# Patient Record
Sex: FEMALE | Race: WHITE | NOT HISPANIC OR LATINO | Employment: FULL TIME | URBAN - METROPOLITAN AREA
[De-identification: names, ages, dates, MRNs, and addresses within clinical notes are randomized per-mention and may not be internally consistent; named-entity substitution may affect disease eponyms.]

---

## 2017-08-03 ENCOUNTER — OFFICE VISIT (OUTPATIENT)
Dept: URGENT CARE | Facility: CLINIC | Age: 57
End: 2017-08-03
Payer: COMMERCIAL

## 2017-08-03 VITALS
OXYGEN SATURATION: 98 % | SYSTOLIC BLOOD PRESSURE: 122 MMHG | TEMPERATURE: 98.1 F | HEART RATE: 72 BPM | WEIGHT: 220 LBS | BODY MASS INDEX: 38.98 KG/M2 | HEIGHT: 63 IN | DIASTOLIC BLOOD PRESSURE: 82 MMHG

## 2017-08-03 DIAGNOSIS — J01.90 ACUTE BACTERIAL SINUSITIS: ICD-10-CM

## 2017-08-03 DIAGNOSIS — B96.89 ACUTE BACTERIAL SINUSITIS: ICD-10-CM

## 2017-08-03 PROCEDURE — 99204 OFFICE O/P NEW MOD 45 MIN: CPT | Performed by: FAMILY MEDICINE

## 2017-08-03 RX ORDER — LISINOPRIL 20 MG/1
20 TABLET ORAL DAILY
COMMUNITY

## 2017-08-03 RX ORDER — DOXYCYCLINE HYCLATE 100 MG
TABLET ORAL
Qty: 20 TAB | Refills: 0 | Status: SHIPPED | OUTPATIENT
Start: 2017-08-03 | End: 2019-01-03

## 2017-08-03 NOTE — MR AVS SNAPSHOT
"        Abigail Cronin [facility]   8/3/2017 5:45 PM   Office Visit   MRN: [id number]    Department:  [facility]   Dept Phone:  [phone]    Description:  Female : 1960   Provider:  Herbert Okeefe M.D.           Reason for Visit     Sinus Problem X 5 days, Sinus pressure, headache, pain on left side of cheeks, Left ear pain       Allergies as of 8/3/2017     Allergen Noted Reactions    Pcn [Penicillins] 2011   Anaphylaxis      You were diagnosed with     Acute bacterial sinusitis   [1986]         Vital Signs     Blood Pressure Pulse Temperature Height Weight Body Mass Index    122/82 mmHg 72 36.7 °C (98.1 °F) 1.6 m (5' 2.99\") 99.791 kg (220 lb) 38.98 kg/m2    Oxygen Saturation Smoking Status                98% Former Smoker          Basic Information     Date Of Birth Sex Race Ethnicity Preferred Language    1960 Female White Non- English      Problem List              ICD-10-CM Priority Class Noted - Resolved    Other joint derangement, not elsewhere classified, ankle and foot M24.873, M24.876   2015 - Present    Malignant hypertension I10   2015 - Present      Health Maintenance        Date Due Completion Dates    IMM DTaP/Tdap/Td Vaccine (1 - Tdap) 3/2/1979 ---    PAP SMEAR 3/2/1981 ---    MAMMOGRAM 3/2/2000 ---    COLONOSCOPY 3/2/2010 ---    IMM INFLUENZA (1) 2017 ---            Current Immunizations     No immunizations on file.      Below and/or attached are the medications your provider expects you to take. Review all of your home medications and newly ordered medications with your provider and/or pharmacist. Follow medication instructions as directed by your provider and/or pharmacist. Please keep your medication list with you and share with your provider. Update the information when medications are discontinued, doses are changed, or new medications (including over-the-counter products) are added; and carry medication information at all times in the event of " emergency situations     Allergies:  PCN - Anaphylaxis               Medications  Valid as of: August 03, 2017 -  6:12 PM    Generic Name Brand Name Tablet Size Instructions for use    Acetaminophen (Tab) TYLENOL 325 MG Take 650 mg by mouth every four hours as needed.        Doxycycline Hyclate (Tab) VIBRAMYCIN 100 MG 1 TAB TWICE A DAY X 10 DAYS.        Hyoscyamine Sulfate (SL Tab) LEVSIN 0.125 MG Take 125 mcg by mouth as needed. Indications: Irritable Bowel Syndrome        Ibuprofen (Tab) MOTRIN 200 MG Take 400 mg by mouth every 6 hours as needed.        Lisinopril (Tab) PRINIVIL 20 MG Take 20 mg by mouth every day.        .                 Medicines prescribed today were sent to:     [facility]ARM[facility]Y #[id number] - [place] - [address]    [address] [place] [address]    Phone: [phone] Fax: [phone]    Open 24 Hours?: No      Medication refill instructions:       If your prescription bottle indicates you have medication refills left, it is not necessary to call your provider’s office. Please contact your pharmacy and they will refill your medication.    If your prescription bottle indicates you do not have any refills left, you may request refills at any time through one of the following ways: The online Lokal[employer] system (except Urgent Care), by calling your provider’s office, or by asking your pharmacy to contact your provider’s office with a refill request. Medication refills are processed only during regular business hours and may not be available until the next business day. Your provider may request additional information or to have a follow-up visit with you prior to refilling your medication.   *Please Note: Medication refills are assigned a new Rx number when refilled electronically. Your pharmacy may indicate that no refills were authorized even though a new prescription for the same medication is available at the pharmacy. Please request the medicine by name with the pharmacy before contacting your provider  for a refill.           Flasmat Access Code: Activation code not generated  Current Flasmat Status: Active

## 2017-08-04 NOTE — PROGRESS NOTES
Chief Complaint:    Chief Complaint   Patient presents with   • Sinus Problem     X 5 days, Sinus pressure, headache, pain on left side of cheeks, Left ear pain        History of Present Illness:    This is a new problem. Symptoms x 5 days; has discomfort in left maxillary sinus region, left upper tooth pain, and pain into left ear. Overall at least moderate severity. Took Ibuprofen with some temporary help. Reports these symptoms are classic for her typical sinus infections that she has been dealing with for the past 30 years and Doxycycline works well/is tolerated for previous similar episodes.      Review of Systems:    Constitutional: Negative for fever, chills, and diaphoresis.   Eyes: Negative for change in vision, photophobia, pain, redness, and discharge.  ENT: See HPI.   Respiratory: Negative for cough, hemoptysis, sputum production, shortness of breath, wheezing, and stridor.    Cardiovascular: Negative for chest pain, palpitations, orthopnea, claudication, leg swelling, and PND.   Gastrointestinal: Negative for abdominal pain, nausea, vomiting, diarrhea, constipation, blood in stool, and melena.   Musculoskeletal: Negative for myalgias, joint pain, neck pain, and back pain.   Skin: Negative for rash and itching.   Neurological: Negative for dizziness, tingling, tremors, sensory change, speech change, focal weakness, seizures, and loss of consciousness.     Past Medical History:    Past Medical History   Diagnosis Date   • Hypertension    • Indigestion    • IBS (irritable bowel syndrome)    • Asthma      childhood       Past Surgical History:    Past Surgical History   Procedure Laterality Date   • Other abdominal surgery       cholecystectomy   • Achilles tendon repair  3/4/2015     Performed by Aristeo Ballard M.D. at SURGERY [facility]   • Tendon transfer  3/4/2015     Performed by Aristeo Ballard M.D. at [facility]       Social History:    Social History     Social History   •  "Marital Status:      Spouse Name: N/A   • Number of Children: N/A   • Years of Education: N/A     Occupational History   • Not on file.     Social History Main Topics   • Smoking status: Former Smoker -- 0.50 packs/day for 25 years     Types: Cigarettes     Quit date: 01/01/1995   • Smokeless tobacco: Never Used   • Alcohol Use: No   • Drug Use: No   • Sexual Activity: Not on file     Other Topics Concern   • Not on file     Social History Narrative       Family History:    History reviewed. No pertinent family history.    Medications:    Current Outpatient Prescriptions on File Prior to Visit   Medication Sig Dispense Refill   • acetaminophen (TYLENOL) 325 MG TABS Take 650 mg by mouth every four hours as needed.     • hyoscyamine (LEVSIN) 0.125 MG SUBL Take 125 mcg by mouth as needed. Indications: Irritable Bowel Syndrome     • ibuprofen (MOTRIN) 200 MG TABS Take 400 mg by mouth every 6 hours as needed.       No current facility-administered medications on file prior to visit.     Lisinopril 20 mg      Allergies:    Allergies   Allergen Reactions   • Pcn [Penicillins] Anaphylaxis         Vitals:    Filed Vitals:    08/03/17 1752   BP: 122/82   Pulse: 72   Temp: 36.7 °C (98.1 °F)   Height: 1.6 m (5' 2.99\")   Weight: 99.791 kg (220 lb)   SpO2: 98%       Physical Exam:    Constitutional: Vital signs reviewed. Appears well-developed and well-nourished. No acute distress.   Eyes: Sclera white, conjunctivae clear. PERRLA.  ENT: TTP left maxillary sinus region. External ears normal. External auditory canals normal without discharge. TMs translucent and non-bulging. Hearing normal. Nasal mucosa pink. Lips/teeth are normal. Oral mucosa pink and moist. Posterior pharynx: WNL.  Neck: Neck supple.   Pulmonary/Chest: Respirations non-labored.   Lymph: Cervical nodes without tenderness or enlargement.  Musculoskeletal: Normal gait. Normal range of motion. No muscular atrophy or weakness.  Neurological: Alert and oriented " to person, place, and time. CN 2-12 intact. Muscle tone normal. Coordination normal.   Skin: No rashes or lesions. Warm, dry, normal turgor.  Psychiatric: Normal mood and affect. Behavior is normal. Judgment and thought content normal.       Assessment / Plan:    1. Acute bacterial sinusitis  - doxycycline (VIBRAMYCIN) 100 MG Tab; 1 TAB TWICE A DAY X 10 DAYS.  Dispense: 20 Tab; Refill: 0      Discussed with her DDX and management options.    Agreeable to medication prescribed.    May continue with Ibuprofen prn pain.    Follow-up with PCP or urgent care if getting worse or not better with above.

## 2017-12-06 ENCOUNTER — HOSPITAL ENCOUNTER (OUTPATIENT)
Dept: HOSPITAL 8 - CFH | Age: 57
End: 2017-12-06
Attending: NURSE PRACTITIONER
Payer: COMMERCIAL

## 2017-12-06 DIAGNOSIS — M54.5: Primary | ICD-10-CM

## 2017-12-06 PROCEDURE — 72110 X-RAY EXAM L-2 SPINE 4/>VWS: CPT

## 2018-05-25 ENCOUNTER — OFFICE VISIT (OUTPATIENT)
Dept: URGENT CARE | Facility: CLINIC | Age: 58
End: 2018-05-25
Payer: COMMERCIAL

## 2018-05-25 VITALS
SYSTOLIC BLOOD PRESSURE: 120 MMHG | RESPIRATION RATE: 16 BRPM | TEMPERATURE: 97.8 F | HEIGHT: 63 IN | OXYGEN SATURATION: 95 % | DIASTOLIC BLOOD PRESSURE: 82 MMHG | BODY MASS INDEX: 36.79 KG/M2 | HEART RATE: 66 BPM | WEIGHT: 207.6 LBS

## 2018-05-25 DIAGNOSIS — J01.00 ACUTE NON-RECURRENT MAXILLARY SINUSITIS: ICD-10-CM

## 2018-05-25 PROCEDURE — 99214 OFFICE O/P EST MOD 30 MIN: CPT | Performed by: NURSE PRACTITIONER

## 2018-05-25 RX ORDER — DOXYCYCLINE 100 MG/1
100 CAPSULE ORAL 2 TIMES DAILY
Qty: 20 CAP | Refills: 0 | Status: SHIPPED | OUTPATIENT
Start: 2018-05-25 | End: 2018-06-04

## 2018-05-25 ASSESSMENT — ENCOUNTER SYMPTOMS
NAUSEA: 0
SINUS PRESSURE: 1
DIZZINESS: 0
CHILLS: 0
COUGH: 0
SHORTNESS OF BREATH: 0
SORE THROAT: 0
SINUS PAIN: 1
HEADACHES: 0
MYALGIAS: 0
FEVER: 0
EYE PAIN: 0
VOMITING: 0

## 2018-05-25 NOTE — PROGRESS NOTES
"  Subjective:   Abigail Domingo a 58 y.o. female who presents for Sinus Problem (x 1 wk, nasal congestion, pressur on Lt. side of face, Lt. ear fullness and upper tooth pain)    Sinusitis   This is a new problem. The current episode started in the past 7 days. The problem is unchanged. There has been no fever. Her pain is at a severity of 7/10. The pain is moderate. Associated symptoms include congestion, ear pain and sinus pressure. Pertinent negatives include no chills, coughing, headaches, shortness of breath or sore throat. Past treatments include nothing. The treatment provided no relief.     Review of Systems   Constitutional: Negative for chills and fever.   HENT: Positive for congestion, ear pain, sinus pain and sinus pressure. Negative for sore throat.    Eyes: Negative for pain.   Respiratory: Negative for cough and shortness of breath.    Cardiovascular: Negative for chest pain.   Gastrointestinal: Negative for nausea and vomiting.   Genitourinary: Negative for hematuria.   Musculoskeletal: Negative for myalgias.   Skin: Negative for rash.   Neurological: Negative for dizziness and headaches.     Allergies   Allergen Reactions   • Pcn [Penicillins] Anaphylaxis      Objective:   /82   Pulse 66   Temp 36.6 °C (97.8 °F)   Resp 16   Ht 1.6 m (5' 2.99\")   Wt 94.2 kg (207 lb 9.6 oz)   SpO2 95%   BMI 36.78 kg/m²   Physical Exam   Constitutional: She is oriented to person, place, and time. She appears well-developed and well-nourished. No distress.   HENT:   Head: Normocephalic and atraumatic.   Right Ear: Tympanic membrane normal.   Left Ear: Tympanic membrane normal.   Nose: Right sinus exhibits maxillary sinus tenderness and frontal sinus tenderness. Left sinus exhibits maxillary sinus tenderness and frontal sinus tenderness.   Mouth/Throat: Uvula is midline, oropharynx is clear and moist and mucous membranes are normal. No posterior oropharyngeal edema, posterior oropharyngeal erythema or " tonsillar abscesses. No tonsillar exudate.   Eyes: Conjunctivae and EOM are normal. Pupils are equal, round, and reactive to light. Right eye exhibits no discharge. Left eye exhibits no discharge.   Cardiovascular: Normal rate and regular rhythm.    No murmur heard.  Pulmonary/Chest: Effort normal and breath sounds normal. No respiratory distress.   Abdominal: Soft. She exhibits no distension. There is no tenderness.   Neurological: She is alert and oriented to person, place, and time. She has normal reflexes. No sensory deficit.   Skin: Skin is warm, dry and intact.   Psychiatric: She has a normal mood and affect.     Assessment/Plan:   Assessment    1. Acute non-recurrent maxillary sinusitis  - doxycycline (MONODOX) 100 MG capsule; Take 1 Cap by mouth 2 times a day for 10 days.  Dispense: 20 Cap; Refill: 0  Advised to continue supportive care with Tylenol and/or ibuprofen for fevers and discomfort. Increased fluids and electrolytes.  Advise Flonase, OTC allergy medication.  Differential diagnosis, natural history, supportive care, and indications for immediate follow-up discussed.  Return if symptoms worsen or fail to improve.

## 2018-05-25 NOTE — PATIENT INSTRUCTIONS

## 2019-01-03 ENCOUNTER — OFFICE VISIT (OUTPATIENT)
Dept: URGENT CARE | Facility: CLINIC | Age: 59
End: 2019-01-03
Payer: COMMERCIAL

## 2019-01-03 VITALS
TEMPERATURE: 98.2 F | DIASTOLIC BLOOD PRESSURE: 82 MMHG | RESPIRATION RATE: 16 BRPM | HEART RATE: 75 BPM | HEIGHT: 63 IN | WEIGHT: 210 LBS | OXYGEN SATURATION: 95 % | BODY MASS INDEX: 37.21 KG/M2 | SYSTOLIC BLOOD PRESSURE: 120 MMHG

## 2019-01-03 DIAGNOSIS — J01.00 ACUTE NON-RECURRENT MAXILLARY SINUSITIS: ICD-10-CM

## 2019-01-03 PROCEDURE — 99214 OFFICE O/P EST MOD 30 MIN: CPT | Performed by: NURSE PRACTITIONER

## 2019-01-03 RX ORDER — LISINOPRIL 40 MG/1
TABLET ORAL
Refills: 1 | COMMUNITY
Start: 2018-10-19

## 2019-01-03 RX ORDER — HYDROCHLOROTHIAZIDE 25 MG/1
TABLET ORAL
Refills: 1 | COMMUNITY
Start: 2018-10-19

## 2019-01-03 RX ORDER — DOXYCYCLINE HYCLATE 100 MG
100 TABLET ORAL 2 TIMES DAILY
Qty: 20 TAB | Refills: 0 | Status: SHIPPED | OUTPATIENT
Start: 2019-01-03 | End: 2019-01-13

## 2019-01-03 RX ORDER — AMLODIPINE BESYLATE 10 MG/1
TABLET ORAL
Refills: 1 | COMMUNITY
Start: 2018-10-22

## 2019-01-03 ASSESSMENT — ENCOUNTER SYMPTOMS
ABDOMINAL PAIN: 0
MUSCULOSKELETAL NEGATIVE: 1
SINUS PAIN: 1
NAUSEA: 0
PALPITATIONS: 0
BLURRED VISION: 0
STRIDOR: 0
CONSTIPATION: 0
CHILLS: 0
SORE THROAT: 0
DIZZINESS: 0
WHEEZING: 0
VOMITING: 0
DOUBLE VISION: 0
DIARRHEA: 0
COUGH: 0
FEVER: 0
HEADACHES: 0

## 2019-01-04 NOTE — PROGRESS NOTES
Subjective:   Abigail Begum is a 58 y.o. female who presents for Sinus Problem (pain & pressure in sinuses, ear pain x 1 week)        HPI   Patient with new onset ear pain and sinus pressure that started a week ago. The problem is constant and unchanged. Symptoms are moderate in severity. Has been trying OTC allergy medications and sinus rinses with no relief. Denies alleviating or aggravating factors.      Hx of sinus surgery - sinus window in the past    Review of Systems   Constitutional: Negative for chills, fever and malaise/fatigue.   HENT: Positive for congestion, ear pain and sinus pain. Negative for ear discharge and sore throat.    Eyes: Negative for blurred vision and double vision.   Respiratory: Negative for cough, wheezing and stridor.    Cardiovascular: Negative for chest pain and palpitations.   Gastrointestinal: Negative for abdominal pain, constipation, diarrhea, nausea and vomiting.   Musculoskeletal: Negative.    Skin: Negative.  Negative for itching and rash.   Neurological: Negative for dizziness and headaches.   All other systems reviewed and are negative.    PMH:  has a past medical history of Asthma; Hypertension; IBS (irritable bowel syndrome); and Indigestion.  MEDS:   Current Outpatient Prescriptions:   •  hydroCHLOROthiazide (HYDRODIURIL) 25 MG Tab, TAKE 1 TABLET BY MOUTH DAILY, Disp: , Rfl: 1  •  amLODIPine (NORVASC) 10 MG Tab, TAKE 1 TABLET BY MOUTH DAILY, Disp: , Rfl: 1  •  doxycycline (VIBRAMYCIN) 100 MG Tab, Take 1 Tab by mouth 2 times a day for 10 days., Disp: 20 Tab, Rfl: 0  •  lisinopril (PRINIVIL) 20 MG Tab, Take 20 mg by mouth every day., Disp: , Rfl:   •  lisinopril (PRINIVIL, ZESTRIL) 40 MG tablet, TAKE 1 TABLET BY MOUTH DAILY, Disp: , Rfl: 1  •  acetaminophen (TYLENOL) 325 MG TABS, Take 650 mg by mouth every four hours as needed., Disp: , Rfl:   •  hyoscyamine (LEVSIN) 0.125 MG SUBL, Take 125 mcg by mouth as needed. Indications: Irritable Bowel Syndrome, Disp: , Rfl:  "  •  ibuprofen (MOTRIN) 200 MG TABS, Take 400 mg by mouth every 6 hours as needed., Disp: , Rfl:   ALLERGIES:   Allergies   Allergen Reactions   • Pcn [Penicillins] Anaphylaxis     SURGHX:   Past Surgical History:   Procedure Laterality Date   • ACHILLES TENDON REPAIR  3/4/2015    Performed by Aristeo Ballard M.D. at SURGERY [facility]   • TENDON TRANSFER  3/4/2015    Performed by Aristeo Ballard M.D. at SURGERY [facility]   • OTHER ABDOMINAL SURGERY      cholecystectomy     SOCHX:  reports that she quit smoking about 24 years ago. Her smoking use included Cigarettes. She has a 12.50 pack-year smoking history. She has never used smokeless tobacco. She reports that she does not drink alcohol or use drugs.  FH: Family history was reviewed, no pertinent findings to report     Objective:   /82   Pulse 75   Temp 36.8 °C (98.2 °F) (Temporal)   Resp 16   Ht 1.6 m (5' 3\")   Wt 95.3 kg (210 lb)   SpO2 95%   BMI 37.20 kg/m²   Physical Exam   Constitutional: She is oriented to person, place, and time. She appears well-developed and well-nourished. No distress.   HENT:   Head: Normocephalic.   Right Ear: Hearing and ear canal normal. Tympanic membrane is not erythematous. A middle ear effusion is present.   Left Ear: Hearing and ear canal normal. Tympanic membrane is not erythematous. A middle ear effusion is present.   Nose: No rhinorrhea. Right sinus exhibits maxillary sinus tenderness. Right sinus exhibits no frontal sinus tenderness. Left sinus exhibits maxillary sinus tenderness and frontal sinus tenderness.   Mouth/Throat: Oropharynx is clear and moist and mucous membranes are normal. No posterior oropharyngeal erythema. Tonsils are 0 on the right. Tonsils are 0 on the left. No tonsillar exudate.   Post nasal drip present   Eyes: Pupils are equal, round, and reactive to light. Conjunctivae, EOM and lids are normal.   Neck: Normal range of motion. No thyromegaly present.   Cardiovascular: Normal " rate, regular rhythm and normal heart sounds.    Pulmonary/Chest: Effort normal and breath sounds normal. No respiratory distress. She has no wheezes.   Abdominal: Soft. Bowel sounds are normal. There is no hepatosplenomegaly.   Lymphadenopathy:        Head (right side): No submandibular and no tonsillar adenopathy present.        Head (left side): No submandibular and no tonsillar adenopathy present.   Neurological: She is alert and oriented to person, place, and time.   Skin: Skin is warm and dry. No rash noted. She is not diaphoretic.   Psychiatric: She has a normal mood and affect. Her behavior is normal. Judgment and thought content normal.   Vitals reviewed.        Assessment/Plan:   Assessment    1. Acute non-recurrent maxillary sinusitis  - doxycycline (VIBRAMYCIN) 100 MG Tab; Take 1 Tab by mouth 2 times a day for 10 days.  Dispense: 20 Tab; Refill: 0    Other orders  - lisinopril (PRINIVIL, ZESTRIL) 40 MG tablet; TAKE 1 TABLET BY MOUTH DAILY; Refill: 1  - hydroCHLOROthiazide (HYDRODIURIL) 25 MG Tab; TAKE 1 TABLET BY MOUTH DAILY; Refill: 1  - amLODIPine (NORVASC) 10 MG Tab; TAKE 1 TABLET BY MOUTH DAILY; Refill: 1    Continue with Zyrtec and Flonase; as directed on package. Patient encouraged to increase clear liquid intake    Follow up with a PCP within 7-10 days    Differential diagnosis, natural history, supportive care, and indications for immediate follow-up discussed.

## 2019-02-26 ENCOUNTER — HOSPITAL ENCOUNTER (OUTPATIENT)
Dept: HOSPITAL 8 - ED | Age: 59
Setting detail: OBSERVATION
Discharge: HOME | End: 2019-02-26
Attending: HOSPITALIST | Admitting: HOSPITALIST
Payer: COMMERCIAL

## 2019-02-26 VITALS — DIASTOLIC BLOOD PRESSURE: 68 MMHG | SYSTOLIC BLOOD PRESSURE: 100 MMHG

## 2019-02-26 VITALS — HEIGHT: 63 IN | BODY MASS INDEX: 37.03 KG/M2 | WEIGHT: 209 LBS

## 2019-02-26 VITALS — DIASTOLIC BLOOD PRESSURE: 69 MMHG | SYSTOLIC BLOOD PRESSURE: 112 MMHG

## 2019-02-26 DIAGNOSIS — K58.9: ICD-10-CM

## 2019-02-26 DIAGNOSIS — M54.9: Primary | ICD-10-CM

## 2019-02-26 DIAGNOSIS — I11.9: ICD-10-CM

## 2019-02-26 DIAGNOSIS — R11.0: ICD-10-CM

## 2019-02-26 DIAGNOSIS — Z79.899: ICD-10-CM

## 2019-02-26 LAB
ANION GAP SERPL CALC-SCNC: 6 MMOL/L (ref 5–15)
BASOPHILS # BLD AUTO: 0.03 X10^3/UL (ref 0–0.1)
BASOPHILS NFR BLD AUTO: 0 % (ref 0–1)
CALCIUM SERPL-MCNC: 8.7 MG/DL (ref 8.5–10.1)
CHLORIDE SERPL-SCNC: 105 MMOL/L (ref 98–107)
CREAT SERPL-MCNC: 0.79 MG/DL (ref 0.55–1.02)
EOSINOPHIL # BLD AUTO: 0.01 X10^3/UL (ref 0–0.4)
EOSINOPHIL NFR BLD AUTO: 0 % (ref 1–7)
ERYTHROCYTE [DISTWIDTH] IN BLOOD BY AUTOMATED COUNT: 12.7 % (ref 9.6–15.2)
EST. AVERAGE GLUCOSE BLD GHB EST-MCNC: 117 MG/DL (ref 0–126)
HBA1C MFR BLD: 5.7 % (ref 4.2–6.3)
LYMPHOCYTES # BLD AUTO: 1.21 X10^3/UL (ref 1–3.4)
LYMPHOCYTES NFR BLD AUTO: 14 % (ref 22–44)
MCH RBC QN AUTO: 30.5 PG (ref 27–34.8)
MCHC RBC AUTO-ENTMCNC: 33.5 G/DL (ref 32.4–35.8)
MCV RBC AUTO: 90.9 FL (ref 80–100)
MD: NO
MONOCYTES # BLD AUTO: 0.34 X10^3/UL (ref 0.2–0.8)
MONOCYTES NFR BLD AUTO: 4 % (ref 2–9)
NEUTROPHILS # BLD AUTO: 7.17 X10^3/UL (ref 1.8–6.8)
NEUTROPHILS NFR BLD AUTO: 82 % (ref 42–75)
PLATELET # BLD AUTO: 352 X10^3/UL (ref 130–400)
PMV BLD AUTO: 7.4 FL (ref 7.4–10.4)
RBC # BLD AUTO: 4.6 X10^6/UL (ref 3.82–5.3)
T4 FREE SERPL-MCNC: 1.02 NG/DL (ref 0.76–1.46)
TSH SERPL-ACNC: 1.28 MIU/L (ref 0.36–3.74)

## 2019-02-26 PROCEDURE — 97161 PT EVAL LOW COMPLEX 20 MIN: CPT

## 2019-02-26 PROCEDURE — 80048 BASIC METABOLIC PNL TOTAL CA: CPT

## 2019-02-26 PROCEDURE — 83036 HEMOGLOBIN GLYCOSYLATED A1C: CPT

## 2019-02-26 PROCEDURE — 83735 ASSAY OF MAGNESIUM: CPT

## 2019-02-26 PROCEDURE — 99284 EMERGENCY DEPT VISIT MOD MDM: CPT

## 2019-02-26 PROCEDURE — 84443 ASSAY THYROID STIM HORMONE: CPT

## 2019-02-26 PROCEDURE — G0378 HOSPITAL OBSERVATION PER HR: HCPCS

## 2019-02-26 PROCEDURE — 84439 ASSAY OF FREE THYROXINE: CPT

## 2019-02-26 PROCEDURE — 97165 OT EVAL LOW COMPLEX 30 MIN: CPT

## 2019-02-26 PROCEDURE — 96372 THER/PROPH/DIAG INJ SC/IM: CPT

## 2019-02-26 PROCEDURE — 36415 COLL VENOUS BLD VENIPUNCTURE: CPT

## 2019-02-26 PROCEDURE — 85025 COMPLETE CBC W/AUTO DIFF WBC: CPT

## 2019-02-26 RX ADMIN — GABAPENTIN SCH MG: 100 CAPSULE ORAL at 07:00

## 2019-02-26 RX ADMIN — GABAPENTIN SCH MG: 100 CAPSULE ORAL at 11:00

## 2019-02-26 NOTE — NUR
0415 D/C NOTE CHANGED AS EMILY DECIED TO CHANGE PT. TO ADMIT AS PT. NOT FEELING 
BETTER FROM HER BACK/LEFT HIP PAIN AND SPASMS.

## 2019-02-26 NOTE — NUR
MEDS FROM DOWNTIME ENTERED AS WRITTEN MEDS AND PROXY CHARTED APPROPRIATLY TO 
MATCH EMAR ON PAPER DURING DOWNTIME.

## 2019-02-26 NOTE — NUR
PT. CONTINUES TO C/O NAUSEA AND IS MOANING/GROANING IN W/C IN ROOM.   
REMAINS AT PT. SIDE.  THEY ARE REQUESTING FOR ERMD TO REASSESS PRIOR TO D/C.  
DR. CAMPOS MADE AWARE OF THIS.

## 2019-05-16 ENCOUNTER — OFFICE VISIT (OUTPATIENT)
Dept: URGENT CARE | Facility: CLINIC | Age: 59
End: 2019-05-16
Payer: COMMERCIAL

## 2019-05-16 VITALS
WEIGHT: 207 LBS | TEMPERATURE: 98 F | SYSTOLIC BLOOD PRESSURE: 128 MMHG | HEIGHT: 63 IN | DIASTOLIC BLOOD PRESSURE: 88 MMHG | RESPIRATION RATE: 16 BRPM | BODY MASS INDEX: 36.68 KG/M2 | OXYGEN SATURATION: 95 % | HEART RATE: 74 BPM

## 2019-05-16 DIAGNOSIS — J01.00 ACUTE NON-RECURRENT MAXILLARY SINUSITIS: ICD-10-CM

## 2019-05-16 PROCEDURE — 99214 OFFICE O/P EST MOD 30 MIN: CPT | Performed by: PHYSICIAN ASSISTANT

## 2019-05-16 RX ORDER — DOXYCYCLINE HYCLATE 100 MG/1
100 CAPSULE ORAL 2 TIMES DAILY
Qty: 14 CAP | Refills: 0 | Status: SHIPPED | OUTPATIENT
Start: 2019-05-16 | End: 2019-05-23

## 2019-05-17 ASSESSMENT — ENCOUNTER SYMPTOMS
WHEEZING: 0
FEVER: 0
CHILLS: 0
EYE REDNESS: 0
EYE PAIN: 0
SHORTNESS OF BREATH: 0
HEADACHES: 1
SORE THROAT: 0
SINUS PRESSURE: 1
EYE DISCHARGE: 0
DIZZINESS: 1
COUGH: 0
PALPITATIONS: 0
SINUS PAIN: 1

## 2019-05-17 NOTE — PROGRESS NOTES
Subjective:      Abigail Begum is a 59 y.o. female who presents with Sinus Problem (x this am, nasal congestion, stuffy nose, headaches, Lt. earfullness and tooth pain)            Sinus Problem   This is a new problem. The current episode started 1 to 4 weeks ago. The problem is unchanged. Associated symptoms include congestion, ear pain, headaches and sinus pressure. Pertinent negatives include no chills, coughing, shortness of breath or sore throat. Past treatments include oral decongestants. The treatment provided no relief.       Review of Systems   Constitutional: Positive for malaise/fatigue. Negative for chills and fever.   HENT: Positive for congestion, ear pain, sinus pain and sinus pressure. Negative for sore throat.    Eyes: Negative for pain, discharge and redness.   Respiratory: Negative for cough, shortness of breath and wheezing.    Cardiovascular: Negative for chest pain and palpitations.   Neurological: Positive for dizziness and headaches.   All other systems reviewed and are negative.    PMH:  has a past medical history of Asthma; Hypertension; IBS (irritable bowel syndrome); and Indigestion.  MEDS:   Current Outpatient Prescriptions:   •  doxycycline (VIBRAMYCIN) 100 MG Cap, Take 1 Cap by mouth 2 times a day for 7 days., Disp: 14 Cap, Rfl: 0  •  lisinopril (PRINIVIL, ZESTRIL) 40 MG tablet, TAKE 1 TABLET BY MOUTH DAILY, Disp: , Rfl: 1  •  hydroCHLOROthiazide (HYDRODIURIL) 25 MG Tab, TAKE 1 TABLET BY MOUTH DAILY, Disp: , Rfl: 1  •  amLODIPine (NORVASC) 10 MG Tab, TAKE 1 TABLET BY MOUTH DAILY, Disp: , Rfl: 1  •  lisinopril (PRINIVIL) 20 MG Tab, Take 20 mg by mouth every day., Disp: , Rfl:   •  acetaminophen (TYLENOL) 325 MG TABS, Take 650 mg by mouth every four hours as needed., Disp: , Rfl:   •  hyoscyamine (LEVSIN) 0.125 MG SUBL, Take 125 mcg by mouth as needed. Indications: Irritable Bowel Syndrome, Disp: , Rfl:   •  ibuprofen (MOTRIN) 200 MG TABS, Take 400 mg by mouth every 6 hours as  "needed., Disp: , Rfl:   ALLERGIES:   Allergies   Allergen Reactions   • Pcn [Penicillins] Anaphylaxis     SURGHX:   Past Surgical History:   Procedure Laterality Date   • ACHILLES TENDON REPAIR  3/4/2015    Performed by Aristeo Ballard M.D. at SURGERY [facility]   • TENDON TRANSFER  3/4/2015    Performed by Aristeo Ballard M.D. at SURGERY [facility]   • OTHER ABDOMINAL SURGERY      cholecystectomy     SOCHX:  reports that she quit smoking about 24 years ago. Her smoking use included Cigarettes. She has a 12.50 pack-year smoking history. She has never used smokeless tobacco. She reports that she does not drink alcohol or use drugs.  FH: Family history was reviewed, no pertinent findings to report  Medications, Allergies, and current problem list reviewed today in Epic         Objective:     /88 (BP Location: Left arm, Patient Position: Sitting, BP Cuff Size: Large adult)   Pulse 74   Temp 36.7 °C (98 °F) (Temporal)   Resp 16   Ht 1.6 m (5' 3\")   Wt 93.9 kg (207 lb)   SpO2 95%   BMI 36.67 kg/m²      Physical Exam   Constitutional: She is oriented to person, place, and time. She appears well-developed and well-nourished.  Non-toxic appearance. She does not have a sickly appearance. She does not appear ill. No distress.   HENT:   Head: Normocephalic and atraumatic.   Right Ear: Hearing, tympanic membrane, external ear and ear canal normal.   Left Ear: Hearing, tympanic membrane, external ear and ear canal normal.   Nose: Mucosal edema and rhinorrhea present. No sinus tenderness. No epistaxis. Right sinus exhibits maxillary sinus tenderness. Left sinus exhibits maxillary sinus tenderness.   Mouth/Throat: Uvula is midline, oropharynx is clear and moist and mucous membranes are normal.   Eyes: Conjunctivae and EOM are normal.   Neck: Normal range of motion. Neck supple.   Cardiovascular: Normal rate, regular rhythm, normal heart sounds and intact distal pulses.    Pulmonary/Chest: Effort normal and " breath sounds normal.   Neurological: She is alert and oriented to person, place, and time.   Skin: Skin is warm and dry.   Psychiatric: She has a normal mood and affect. Her behavior is normal. Judgment and thought content normal.   Vitals reviewed.              Assessment/Plan:     1. Acute non-recurrent maxillary sinusitis    - doxycycline (VIBRAMYCIN) 100 MG Cap; Take 1 Cap by mouth 2 times a day for 7 days.  Dispense: 14 Cap; Refill: 0    Differential diagnosis, natural history, supportive care discussed. Follow-up with primary care provider within 7-10 days, emergency room precautions discussed.  Patient and/or family appears understanding of information.  Handout and review of patients diagnosis and treatment was discussed extensively.

## 2019-12-17 ENCOUNTER — OFFICE VISIT (OUTPATIENT)
Dept: URGENT CARE | Facility: CLINIC | Age: 59
End: 2019-12-17
Payer: COMMERCIAL

## 2019-12-17 VITALS
HEART RATE: 77 BPM | SYSTOLIC BLOOD PRESSURE: 116 MMHG | BODY MASS INDEX: 37.53 KG/M2 | OXYGEN SATURATION: 95 % | WEIGHT: 211.8 LBS | HEIGHT: 63 IN | DIASTOLIC BLOOD PRESSURE: 80 MMHG | RESPIRATION RATE: 16 BRPM | TEMPERATURE: 98.2 F

## 2019-12-17 DIAGNOSIS — J32.9 RECURRENT SINUSITIS: ICD-10-CM

## 2019-12-17 DIAGNOSIS — J01.00 ACUTE NON-RECURRENT MAXILLARY SINUSITIS: ICD-10-CM

## 2019-12-17 PROCEDURE — 99214 OFFICE O/P EST MOD 30 MIN: CPT | Performed by: NURSE PRACTITIONER

## 2019-12-17 RX ORDER — DOXYCYCLINE HYCLATE 100 MG
100 TABLET ORAL 2 TIMES DAILY
Qty: 20 TAB | Refills: 0 | Status: SHIPPED | OUTPATIENT
Start: 2019-12-17 | End: 2019-12-27

## 2019-12-18 NOTE — PROGRESS NOTES
Chief Complaint   Patient presents with   • Sinus Pain     x3 days       HISTORY OF PRESENT ILLNESS: Patient is a 59 y.o. female who presents today due to three days of nasal congestion, malaise, fatigue, and sinus pressure.  She denies associated fever, cough, difficulty breathing, confusion, nausea, vomiting or diarrhea.  She has tried OTC cold/sinus medication at home without much improvement.  Admits to history of recurring sinus infections from a blocked tear duct, always left-sided and maxillary, symptoms today similar.  She has not seen an ENT recently.  Notes she has approximately 4-5 sinus infections per year.      Patient Active Problem List    Diagnosis Date Noted   • Other joint derangement, not elsewhere classified, ankle and foot 02/11/2015   • Malignant hypertension 02/11/2015       Allergies:Pcn [penicillins]    Current Outpatient Medications Ordered in Epic   Medication Sig Dispense Refill   • doxycycline (VIBRAMYCIN) 100 MG Tab Take 1 Tab by mouth 2 times a day for 10 days. 20 Tab 0   • hydroCHLOROthiazide (HYDRODIURIL) 25 MG Tab TAKE 1 TABLET BY MOUTH DAILY  1   • amLODIPine (NORVASC) 10 MG Tab TAKE 1 TABLET BY MOUTH DAILY  1   • lisinopril (PRINIVIL) 20 MG Tab Take 20 mg by mouth every day.     • lisinopril (PRINIVIL, ZESTRIL) 40 MG tablet TAKE 1 TABLET BY MOUTH DAILY  1   • acetaminophen (TYLENOL) 325 MG TABS Take 650 mg by mouth every four hours as needed.     • hyoscyamine (LEVSIN) 0.125 MG SUBL Take 125 mcg by mouth as needed. Indications: Irritable Bowel Syndrome     • ibuprofen (MOTRIN) 200 MG TABS Take 400 mg by mouth every 6 hours as needed.       No current Epic-ordered facility-administered medications on file.        Past Medical History:   Diagnosis Date   • Asthma     childhood   • Hypertension    • IBS (irritable bowel syndrome)    • Indigestion        Social History     Tobacco Use   • Smoking status: Former Smoker     Packs/day: 0.50     Years: 25.00     Pack years: 12.50      "Types: Cigarettes     Last attempt to quit: 1995     Years since quittin.9   • Smokeless tobacco: Never Used   Substance Use Topics   • Alcohol use: No     Alcohol/week: 0.0 oz   • Drug use: No       No family status information on file.   History reviewed. No pertinent family history.    ROS:  Review of Systems   Constitutional: Positive for malaise, fatigue.  Negative for fever, chills.  F  HENT: Positive for congestion, left-sided sinus pressure which radiates to left ear and upper teeth.  Negative for sore throat, ear pain, nosebleeds, neck pain.    Eyes: Negative for vision changes.   Neuro: Positive for headache. Negative for sensory changes, weakness, seizure, LOC.  Cardiovascular: Negative for chest pain, palpitations, orthopnea and leg swelling.   Respiratory: Negative for cough, sputum production, shortness of breath and wheezing.   Gastrointestinal: Negative for abdominal pain, nausea, vomiting or diarrhea.    Skin: Negative for rash, diaphoresis.     Exam:  /80 (BP Location: Left arm, Patient Position: Sitting, BP Cuff Size: Large adult)   Pulse 77   Temp 36.8 °C (98.2 °F)   Resp 16   Ht 1.6 m (5' 3\")   Wt 96.1 kg (211 lb 12.8 oz)   SpO2 95%   General: well-nourished, well-developed female in NAD  Head: normocephalic, atraumatic  Eyes: PERRLA, no conjunctival injection, acuity grossly intact, lids normal.  Ears: normal shape and symmetry, no tenderness, no discharge. External canals are without any significant edema or erythema. Tympanic membranes are without any inflammation, no effusion. Gross auditory acuity is intact.  Nose: symmetrical without tenderness, erythema and swelling noted bilateral turbinates, clear discharge.  Left-sided maxillary sinus tenderness.   Mouth/Throat: reasonable hygiene, no exudates or tonsillar enlargement. Erythema is present.   Neck: no masses, range of motion within normal limits, no tracheal deviation. No obvious thyroid enlargement.   Lymph: no " cervical adenopathy. No supraclavicular adenopathy.   Neuro: alert and oriented. Cranial nerves 1-12 grossly intact. No sensory deficit.   Cardiovascular: regular rate and rhythm. No edema.  Pulmonary: no distress. Chest is symmetrical with respiration, no wheezes, crackles, or rhonchi.   Musculoskeletal: no clubbing, appropriate muscle tone, gait is stable.  Skin: warm, dry, intact, no clubbing, no cyanosis, no rashes.   Psych: appropriate mood, affect, judgement.         Assessment/Plan:  1. Acute non-recurrent maxillary sinusitis  doxycycline (VIBRAMYCIN) 100 MG Tab    REFERRAL TO ENT   2. Recurrent sinusitis  REFERRAL TO ENT         Antibiotic as directed, potential side effects of medication discussed. Probiotic use encouraged. Flonase as directed.   Nasal washes with sterile saline solution daily. Sleep with HOB elevated, humidifier at night, rest, increase fluid intake.  Referral to ENT placed.  Supportive care, differential diagnoses, and indications for immediate follow-up discussed with patient.   Pathogenesis of diagnosis discussed including typical length and natural progression.   Instructed to return to clinic or nearest emergency department for any change in condition, further concerns, or worsening of symptoms.  Patient states understanding of the plan of care and discharge instructions.  Instructed to make an appointment, for follow up, with her primary care provider.        Please note that this dictation was created using voice recognition software. I have made every reasonable attempt to correct obvious errors, but I expect that there are errors of grammar and possibly content that I did not discover before finalizing the note.      AWILDA Cardenas.

## 2020-02-17 ENCOUNTER — HOSPITAL ENCOUNTER (EMERGENCY)
Dept: HOSPITAL 8 - ED | Age: 60
Discharge: HOME | End: 2020-02-17
Payer: COMMERCIAL

## 2020-02-17 VITALS — WEIGHT: 211.64 LBS | HEIGHT: 63 IN | BODY MASS INDEX: 37.5 KG/M2

## 2020-02-17 VITALS — SYSTOLIC BLOOD PRESSURE: 151 MMHG | DIASTOLIC BLOOD PRESSURE: 94 MMHG

## 2020-02-17 DIAGNOSIS — J20.8: Primary | ICD-10-CM

## 2020-02-17 DIAGNOSIS — J45.909: ICD-10-CM

## 2020-02-17 DIAGNOSIS — I10: ICD-10-CM

## 2020-02-17 PROCEDURE — 99285 EMERGENCY DEPT VISIT HI MDM: CPT

## 2020-02-17 PROCEDURE — 71046 X-RAY EXAM CHEST 2 VIEWS: CPT

## 2020-02-17 NOTE — NUR
JAMEY PA AT BEDSIDE FOR RECHECK, PT CLEARED FOR DISCHARGE, PREDNISONE HELD AT 
THIS TIME PER JAMEY MANE. BP IMPROVED /94.

## 2020-02-17 NOTE — NUR
RECEVIED BEDSIDE REPORT AND CARE FROM BRITTANY SOMMERS. PT RESTING IN POSITION OF 
COMFORT. SR ON MONITOR. BP ELEVATED, HX HTN, STATES "I DID NOT TAKE MY 
MEDICATIONS FOR MY BLOOD PRESSURE YET TODAY, THAT'S WHY IT IS HIGH." TO DISCUSS 
BP AND PREDNISONE WITH PROVIDER. PT AWAITING RECHECK. CALL LIGHT IN REACH. FALL 
PRECAUTIONS IN PLACE.

## 2020-02-17 NOTE — NUR
report given to TOOTIE Hinojosa who is to assume care at this time., pt medicated by 
task TOOTIE Collazo. Pt to receive bp meds prior to prednisone per EDPA 
English. all montiors in place, pt is nsr on cardiac monitor with no ectopy. pt 
a&o, rsps even and unlabored, nadn at this time.

## 2020-12-04 ENCOUNTER — HOSPITAL ENCOUNTER (EMERGENCY)
Dept: HOSPITAL 8 - ED | Age: 60
Discharge: HOME | End: 2020-12-04
Payer: COMMERCIAL

## 2020-12-04 VITALS — DIASTOLIC BLOOD PRESSURE: 91 MMHG | SYSTOLIC BLOOD PRESSURE: 139 MMHG

## 2020-12-04 VITALS — WEIGHT: 207.68 LBS | HEIGHT: 64 IN | BODY MASS INDEX: 35.45 KG/M2

## 2020-12-04 DIAGNOSIS — R05: ICD-10-CM

## 2020-12-04 DIAGNOSIS — M79.10: ICD-10-CM

## 2020-12-04 DIAGNOSIS — R19.7: ICD-10-CM

## 2020-12-04 DIAGNOSIS — R09.81: ICD-10-CM

## 2020-12-04 DIAGNOSIS — I10: ICD-10-CM

## 2020-12-04 DIAGNOSIS — R43.9: ICD-10-CM

## 2020-12-04 DIAGNOSIS — R06.02: ICD-10-CM

## 2020-12-04 DIAGNOSIS — J18.9: Primary | ICD-10-CM

## 2020-12-04 DIAGNOSIS — R07.89: ICD-10-CM

## 2020-12-04 PROCEDURE — 71045 X-RAY EXAM CHEST 1 VIEW: CPT

## 2020-12-04 PROCEDURE — 99283 EMERGENCY DEPT VISIT LOW MDM: CPT

## 2020-12-04 PROCEDURE — 93005 ELECTROCARDIOGRAM TRACING: CPT

## 2021-10-23 ENCOUNTER — OFFICE VISIT (OUTPATIENT)
Dept: URGENT CARE | Facility: CLINIC | Age: 61
End: 2021-10-23
Payer: COMMERCIAL

## 2021-10-23 VITALS
HEIGHT: 63 IN | WEIGHT: 176.6 LBS | HEART RATE: 70 BPM | OXYGEN SATURATION: 97 % | RESPIRATION RATE: 14 BRPM | SYSTOLIC BLOOD PRESSURE: 128 MMHG | TEMPERATURE: 97.1 F | BODY MASS INDEX: 31.29 KG/M2 | DIASTOLIC BLOOD PRESSURE: 82 MMHG

## 2021-10-23 DIAGNOSIS — J01.40 ACUTE NON-RECURRENT PANSINUSITIS: ICD-10-CM

## 2021-10-23 PROCEDURE — 99214 OFFICE O/P EST MOD 30 MIN: CPT | Performed by: PHYSICIAN ASSISTANT

## 2021-10-23 RX ORDER — HYOSCYAMINE SULFATE 0.125 MG
TABLET ORAL
COMMUNITY
Start: 2021-10-11

## 2021-10-23 RX ORDER — DOXYCYCLINE HYCLATE 100 MG
100 TABLET ORAL 2 TIMES DAILY
Qty: 14 TABLET | Refills: 0 | Status: SHIPPED | OUTPATIENT
Start: 2021-10-23 | End: 2021-10-30

## 2021-10-23 ASSESSMENT — ENCOUNTER SYMPTOMS
CHILLS: 1
SINUS PRESSURE: 1
COUGH: 0
HEADACHES: 1
SORE THROAT: 1
SHORTNESS OF BREATH: 0

## 2021-10-23 NOTE — PROGRESS NOTES
"Subjective:   Abigail Begum is a 61 y.o. female who presents for Sinusitis (3X DAYS, PAIN IN EARS, LEFT SINUS, EYES HURT, HEADACHE )      Sinusitis  This is a new problem. Episode onset: 4 days ago  The problem has been gradually worsening since onset. There has been no fever. The pain is mild. Associated symptoms include chills, ear pain (left ), headaches (sinus), sinus pressure and a sore throat (slight ). Pertinent negatives include no congestion, coughing or shortness of breath. Treatments tried: Ibuprofen  The treatment provided mild relief.   History of sinus infections. History of left sinus surgery.         Medications:    • acetaminophen Tabs  • amLODIPine Tabs  • hydroCHLOROthiazide Tabs  • hyoscyamine Subl  • ibuprofen Tabs  • lisinopril  • lisinopril Tabs    Allergies: Pcn [penicillins]    Problem List: Abigail Begum does not have any pertinent problems on file.    Surgical History:  Past Surgical History:   Procedure Laterality Date   • ACHILLES TENDON REPAIR  3/4/2015    Performed by Aristeo Ballard M.D. at SURGERY [facility]   • TENDON TRANSFER  3/4/2015    Performed by Aristeo Ballard M.D. at SURGERY [facility]   • OTHER ABDOMINAL SURGERY      cholecystectomy       Past Social Hx: Abigail Begum  reports that she quit smoking about 26 years ago. Her smoking use included cigarettes. She has a 12.50 pack-year smoking history. She has never used smokeless tobacco. She reports that she does not drink alcohol and does not use drugs.     Past Family Hx:  Abigail Begum family history is not on file.     Problem list, medications, and allergies reviewed by myself today in Epic.     Objective:     /82 (BP Location: Left arm, Patient Position: Sitting, BP Cuff Size: Adult)   Pulse 70   Temp 36.2 °C (97.1 °F) (Temporal)   Resp 14   Ht 1.6 m (5' 3\")   Wt 80.1 kg (176 lb 9.6 oz)   SpO2 97%   BMI 31.28 kg/m²     Physical Exam  Vitals reviewed.   Constitutional:       " General: She is not in acute distress.     Appearance: Normal appearance. She is not ill-appearing or toxic-appearing.   HENT:      Right Ear: Tympanic membrane normal.      Left Ear: Tympanic membrane normal.      Nose: Mucosal edema and rhinorrhea present. Rhinorrhea is purulent.      Right Sinus: No maxillary sinus tenderness or frontal sinus tenderness.      Left Sinus: Maxillary sinus tenderness present. No frontal sinus tenderness.      Mouth/Throat:      Mouth: Mucous membranes are moist.      Pharynx: Oropharynx is clear. No oropharyngeal exudate or posterior oropharyngeal erythema.   Eyes:      Conjunctiva/sclera: Conjunctivae normal.      Pupils: Pupils are equal, round, and reactive to light.   Cardiovascular:      Rate and Rhythm: Normal rate and regular rhythm.      Heart sounds: Normal heart sounds.   Pulmonary:      Effort: Pulmonary effort is normal. No respiratory distress.      Breath sounds: Normal breath sounds. No wheezing, rhonchi or rales.   Musculoskeletal:      Cervical back: Neck supple.   Lymphadenopathy:      Cervical: No cervical adenopathy.   Neurological:      General: No focal deficit present.      Mental Status: She is alert and oriented to person, place, and time.   Psychiatric:         Mood and Affect: Mood normal.         Behavior: Behavior normal.         Diagnosis and associated orders:     1. Acute non-recurrent pansinusitis  doxycycline (VIBRAMYCIN) 100 MG Tab        Comments/MDM:     Discussed patient's signs and symptoms are consistent with sinusitis most likely viral etiology at this time.   Patient insists she needs doxycycline to get rid of her sinus infection.  She has a history of sinus surgery to her left sinus.  Her sinus pain is worse on the left side.  I had a thorough conversation and education regarding viral etiology versus bacterial etiology and indications for antibiotic treatment. Contingent antibiotic prescription given to patient to fill upon meeting  criteria of strict guidelines discussed in length.   In the mean time, Encouraged plenty of fluids, Flonase, nasal saline washes or gladys pot, Mucinex, Ibuprofen and Tylenol for pain. They may take a non-drowsy oral antihistamine in the morning.          I personally reviewed prior external notes and test results pertinent to today's visit.  Supportive care, natural history, differential diagnoses, and indications for immediate follow-up discussed. Patient expresses understanding and agrees to plan. Patient denies any other questions or concerns.     Follow-up with the primary care physician for recheck, reevaluation, and consideration of further management.    Time spent evaluating the patient was 30 minutes which included preparing for the visit, obtaining history, examination, ordering labs/tests/procedures/medications, independent interpretation, discussion of plan, counseling/education, medical information reconciliation, and documentation into chart.     Please note that this dictation was created using voice recognition software. I have made a reasonable attempt to correct obvious errors, but I expect that there are errors of grammar and possibly content that I did not discover before finalizing the note.    This note was electronically signed by Yair Ramirez PA-C

## 2022-07-01 ENCOUNTER — OFFICE VISIT (OUTPATIENT)
Dept: URGENT CARE | Facility: CLINIC | Age: 62
End: 2022-07-01
Payer: COMMERCIAL

## 2022-07-01 VITALS
TEMPERATURE: 97.3 F | BODY MASS INDEX: 32.44 KG/M2 | HEIGHT: 64 IN | WEIGHT: 190 LBS | SYSTOLIC BLOOD PRESSURE: 140 MMHG | DIASTOLIC BLOOD PRESSURE: 90 MMHG | RESPIRATION RATE: 16 BRPM | OXYGEN SATURATION: 95 % | HEART RATE: 66 BPM

## 2022-07-01 DIAGNOSIS — J01.01 ACUTE RECURRENT MAXILLARY SINUSITIS: ICD-10-CM

## 2022-07-01 PROCEDURE — 99214 OFFICE O/P EST MOD 30 MIN: CPT | Performed by: FAMILY MEDICINE

## 2022-07-01 RX ORDER — DOXYCYCLINE HYCLATE 100 MG
100 TABLET ORAL 2 TIMES DAILY
Qty: 14 TABLET | Refills: 0 | Status: SHIPPED | OUTPATIENT
Start: 2022-07-01 | End: 2022-07-08

## 2022-07-01 ASSESSMENT — ENCOUNTER SYMPTOMS
COUGH: 0
SINUS PAIN: 1
VOMITING: 0
FEVER: 0

## 2022-07-01 NOTE — PROGRESS NOTES
Subjective:     Abigail Begum is a 62 y.o. female who presents for Sinus Problem (X 2-3 wks, nasal congestion, headaches, Lt.ear pain and jaw pain)    HPI  Pt presents for evaluation of a recurrent problem  Patient with recurrent sinusitis  States that she has significant allergies and recurrent sinus infections  Has had a sinus surgery in the past, however this was many years ago  Patient with left sinus pain, jaw pain, nasal burning, headaches, ear pain for the past 3 weeks  Having constant pressure in the sinus area  No cough   No fevers   No N/V/D     Review of Systems   Constitutional: Negative for fever.   HENT: Positive for ear pain and sinus pain.    Respiratory: Negative for cough.    Gastrointestinal: Negative for vomiting.   Skin: Negative for rash.     PMH:  has a past medical history of Asthma, Hypertension, IBS (irritable bowel syndrome), and Indigestion.  MEDS:   Current Outpatient Medications:   •  doxycycline (VIBRAMYCIN) 100 MG Tab, Take 1 Tablet by mouth 2 times a day for 7 days., Disp: 14 Tablet, Rfl: 0  •  lisinopril (PRINIVIL, ZESTRIL) 40 MG tablet, TAKE 1 TABLET BY MOUTH DAILY, Disp: , Rfl: 1  •  hydroCHLOROthiazide (HYDRODIURIL) 25 MG Tab, TAKE 1 TABLET BY MOUTH DAILY, Disp: , Rfl: 1  •  amLODIPine (NORVASC) 10 MG Tab, TAKE 1 TABLET BY MOUTH DAILY, Disp: , Rfl: 1  •  hyoscyamine (LEVSIN) 0.125 MG tablet, , Disp: , Rfl:   •  lisinopril (PRINIVIL) 20 MG Tab, Take 20 mg by mouth every day., Disp: , Rfl:   •  acetaminophen (TYLENOL) 325 MG TABS, Take 650 mg by mouth every four hours as needed., Disp: , Rfl:   •  hyoscyamine (LEVSIN) 0.125 MG SUBL, Take 125 mcg by mouth as needed. Indications: Irritable Bowel Syndrome, Disp: , Rfl:   •  ibuprofen (MOTRIN) 200 MG TABS, Take 400 mg by mouth every 6 hours as needed., Disp: , Rfl:   ALLERGIES:   Allergies   Allergen Reactions   • Pcn [Penicillins] Anaphylaxis     SURGHX:   Past Surgical History:   Procedure Laterality Date   • ACHILLES TENDON  "REPAIR  3/4/2015    Performed by Aristeo Ballard M.D. at SURGERY [facility]   • TENDON TRANSFER  3/4/2015    Performed by Aristeo Ballard M.D. at SURGERY [facility]   • OTHER ABDOMINAL SURGERY      cholecystectomy     SOCHX:  reports that she quit smoking about 27 years ago. Her smoking use included cigarettes. She has a 12.50 pack-year smoking history. She has never used smokeless tobacco. She reports that she does not drink alcohol and does not use drugs.     Objective:   BP (!) 140/90 (BP Location: Left arm, Patient Position: Sitting, BP Cuff Size: Large adult)   Pulse 66   Temp 36.3 °C (97.3 °F) (Temporal)   Resp 16   Ht 1.626 m (5' 4\")   Wt 86.2 kg (190 lb)   SpO2 95%   BMI 32.61 kg/m²     Physical Exam  Constitutional:       General: She is not in acute distress.     Appearance: She is well-developed. She is not diaphoretic.   HENT:      Head: Normocephalic and atraumatic.      Right Ear: Tympanic membrane, ear canal and external ear normal.      Left Ear: Tympanic membrane, ear canal and external ear normal.      Nose:      Comments: Swelling in left naris with mild purulent discharge present     Mouth/Throat:      Mouth: Mucous membranes are moist.      Pharynx: Oropharynx is clear. No oropharyngeal exudate or posterior oropharyngeal erythema.   Pulmonary:      Effort: Pulmonary effort is normal.   Musculoskeletal:      Cervical back: Normal range of motion and neck supple. No tenderness.   Lymphadenopathy:      Cervical: No cervical adenopathy.   Neurological:      Mental Status: She is alert.       Assessment/Plan:   Assessment    1. Acute recurrent maxillary sinusitis  - doxycycline (VIBRAMYCIN) 100 MG Tab; Take 1 Tablet by mouth 2 times a day for 7 days.  Dispense: 14 Tablet; Refill: 0    Patient with maxillary sinusitis.  Has been present for 3 weeks and appears to be bacterial infection at this point.  Treat with doxycycline as she has done well with this in the past.  Did discuss " possibly seeing an ENT as this does happen to her quite frequently.  Patient is agreeable and will follow up in the urgent care as needed.

## 2024-01-04 ENCOUNTER — OFFICE VISIT (OUTPATIENT)
Dept: URGENT CARE | Facility: CLINIC | Age: 64
End: 2024-01-04

## 2024-01-04 VITALS
BODY MASS INDEX: 35.26 KG/M2 | RESPIRATION RATE: 16 BRPM | OXYGEN SATURATION: 98 % | SYSTOLIC BLOOD PRESSURE: 124 MMHG | TEMPERATURE: 97.2 F | WEIGHT: 199 LBS | HEIGHT: 63 IN | DIASTOLIC BLOOD PRESSURE: 72 MMHG | HEART RATE: 84 BPM

## 2024-01-04 DIAGNOSIS — J01.00 ACUTE NON-RECURRENT MAXILLARY SINUSITIS: ICD-10-CM

## 2024-01-04 PROCEDURE — 3078F DIAST BP <80 MM HG: CPT | Performed by: NURSE PRACTITIONER

## 2024-01-04 PROCEDURE — 99213 OFFICE O/P EST LOW 20 MIN: CPT | Performed by: NURSE PRACTITIONER

## 2024-01-04 PROCEDURE — 3074F SYST BP LT 130 MM HG: CPT | Performed by: NURSE PRACTITIONER

## 2024-01-04 RX ORDER — DOXYCYCLINE HYCLATE 100 MG
100 TABLET ORAL 2 TIMES DAILY
Qty: 20 TABLET | Refills: 0 | Status: SHIPPED | OUTPATIENT
Start: 2024-01-04 | End: 2024-01-14

## 2024-01-04 ASSESSMENT — ENCOUNTER SYMPTOMS: SINUS PRESSURE: 1

## 2024-01-04 ASSESSMENT — VISUAL ACUITY: OU: 1

## 2024-01-05 NOTE — PROGRESS NOTES
Subjective:     Abigail Begum is a 63 y.o. female who presents for Sinus Problem (Sinus infection x 6 days , headaches )       Sinus Problem  This is a new problem. Episode onset: 6 days. The problem has been gradually worsening since onset. Associated symptoms include ear pain (Left) and sinus pressure (Left maxillary). (Left upper tooth pain) Treatments tried: OTC. The treatment provided no relief.     Hx of sinus infections with similar sx. Hx of sinus surgery 40 years ago. Doxycycline as helped with recurrent episodes.    Review of Systems   HENT:  Positive for ear pain (Left) and sinus pressure (Left maxillary).      Refer to HPI for additional details.    During this visit, appropriate PPE was worn, and hand hygiene was performed.    PMH:  has a past medical history of Asthma, Hypertension, IBS (irritable bowel syndrome), and Indigestion.    MEDS:   Current Outpatient Medications:     doxycycline (VIBRAMYCIN) 100 MG Tab, Take 1 Tablet by mouth 2 times a day for 10 days., Disp: 20 Tablet, Rfl: 0    hyoscyamine (LEVSIN) 0.125 MG tablet, , Disp: , Rfl:     lisinopril (PRINIVIL, ZESTRIL) 40 MG tablet, TAKE 1 TABLET BY MOUTH DAILY, Disp: , Rfl: 1    hydroCHLOROthiazide (HYDRODIURIL) 25 MG Tab, TAKE 1 TABLET BY MOUTH DAILY, Disp: , Rfl: 1    amLODIPine (NORVASC) 10 MG Tab, TAKE 1 TABLET BY MOUTH DAILY, Disp: , Rfl: 1    lisinopril (PRINIVIL) 20 MG Tab, Take 20 mg by mouth every day., Disp: , Rfl:     acetaminophen (TYLENOL) 325 MG TABS, Take 650 mg by mouth every four hours as needed., Disp: , Rfl:     hyoscyamine (LEVSIN) 0.125 MG SUBL, Take 125 mcg by mouth as needed. Indications: Irritable Bowel Syndrome, Disp: , Rfl:     ibuprofen (MOTRIN) 200 MG TABS, Take 400 mg by mouth every 6 hours as needed., Disp: , Rfl:     ALLERGIES:   Allergies   Allergen Reactions    Pcn [Penicillins] Anaphylaxis     SURGHX:   Past Surgical History:   Procedure Laterality Date    ACHILLES TENDON REPAIR  3/4/2015    Performed by  "Aristeo Ballard M.D. at SURGERY [facility]    TENDON TRANSFER  3/4/2015    Performed by Aristeo Ballard M.D. at SURGERY [facility]    OTHER ABDOMINAL SURGERY      cholecystectomy     SOCHX:  reports that she quit smoking about 29 years ago. Her smoking use included cigarettes. She started smoking about 54 years ago. She has a 12.5 pack-year smoking history. She has never used smokeless tobacco. She reports that she does not drink alcohol and does not use drugs.    FH: Per HPI as applicable/pertinent.      Objective:     /72 (BP Location: Left arm, Patient Position: Sitting, BP Cuff Size: Adult)   Pulse 84   Temp 36.2 °C (97.2 °F) (Temporal)   Resp 16   Ht 1.6 m (5' 3\")   Wt 90.3 kg (199 lb)   SpO2 98%   BMI 35.25 kg/m²     Physical Exam  Nursing note reviewed.   Constitutional:       General: She is not in acute distress.     Appearance: She is well-developed. She is not ill-appearing or toxic-appearing.   HENT:      Right Ear: Tympanic membrane normal.      Left Ear: Tympanic membrane normal.      Nose: Congestion present.      Left Sinus: Maxillary sinus tenderness present.      Mouth/Throat:      Mouth: Mucous membranes are moist.      Pharynx: Oropharynx is clear.   Eyes:      General: Vision grossly intact.   Neck:      Trachea: Phonation normal.   Cardiovascular:      Rate and Rhythm: Normal rate.   Pulmonary:      Effort: Pulmonary effort is normal. No respiratory distress.   Musculoskeletal:         General: No deformity. Normal range of motion.   Skin:     General: Skin is warm and dry.      Coloration: Skin is not pale.   Neurological:      Mental Status: She is alert and oriented to person, place, and time.      Motor: No weakness.   Psychiatric:         Behavior: Behavior normal. Behavior is cooperative.       Assessment/Plan:     1. Acute non-recurrent maxillary sinusitis  - doxycycline (VIBRAMYCIN) 100 MG Tab; Take 1 Tablet by mouth 2 times a day for 10 days.  Dispense: 20 " Tablet; Refill: 0    Rx as above sent electronically.    Suggest using any combination of the following over-the-counter medications per 's instructions:  - sinus rinse kits, neti pot, nasal saline sprays  - nasal steroid sprays (e.g. fluticasone/Flonase, Nasacort)  - oral daily antihistamine (e.g. loratadine/Claritin, Zyrtec, Allegra)  - oral decongestant (e.g. pseudoephedrine, Sudafed)  - oral pain reliever/fever reducer (e.g. acetaminophen, Tylenol)  - oral anti-inflammatory/pain reliever/fever reducer (NSAID) (e.g. ibuprofen, Motrin, Advil)    Differential diagnosis, natural history, supportive care, over-the-counter symptom management per 's instructions, close monitoring, and indications for immediate follow-up discussed.     All questions answered. Patient agrees with the plan of care.    [email or web address]